# Patient Record
Sex: FEMALE | Race: WHITE | Employment: FULL TIME | URBAN - METROPOLITAN AREA
[De-identification: names, ages, dates, MRNs, and addresses within clinical notes are randomized per-mention and may not be internally consistent; named-entity substitution may affect disease eponyms.]

---

## 2024-05-01 LAB — HBA1C MFR BLD HPLC: 5.9 %

## 2024-08-19 LAB — HBA1C MFR BLD HPLC: 6.2 %

## 2024-08-29 ENCOUNTER — OFFICE VISIT (OUTPATIENT)
Dept: FAMILY MEDICINE CLINIC | Facility: HOSPITAL | Age: 54
End: 2024-08-29

## 2024-08-29 VITALS
HEIGHT: 60 IN | WEIGHT: 162.8 LBS | SYSTOLIC BLOOD PRESSURE: 115 MMHG | BODY MASS INDEX: 31.96 KG/M2 | DIASTOLIC BLOOD PRESSURE: 80 MMHG | OXYGEN SATURATION: 95 % | HEART RATE: 79 BPM | TEMPERATURE: 98.3 F

## 2024-08-29 DIAGNOSIS — G44.209 TENSION HEADACHE: Primary | ICD-10-CM

## 2024-08-29 DIAGNOSIS — Z12.11 SCREENING FOR MALIGNANT NEOPLASM OF COLON: ICD-10-CM

## 2024-08-29 DIAGNOSIS — R06.83 SNORING: ICD-10-CM

## 2024-08-29 DIAGNOSIS — H57.9 ITCHY EYES: ICD-10-CM

## 2024-08-29 DIAGNOSIS — Z12.31 SCREENING MAMMOGRAM FOR BREAST CANCER: ICD-10-CM

## 2024-08-29 DIAGNOSIS — G47.00 INSOMNIA, UNSPECIFIED TYPE: ICD-10-CM

## 2024-08-29 PROCEDURE — 99214 OFFICE O/P EST MOD 30 MIN: CPT

## 2024-08-29 RX ORDER — FENOFIBRATE 145 MG/1
145 TABLET, COATED ORAL DAILY
COMMUNITY

## 2024-08-29 RX ORDER — IBUPROFEN 400 MG/1
400 TABLET, FILM COATED ORAL EVERY 4 HOURS PRN
Qty: 90 TABLET | Refills: 0 | Status: SHIPPED | OUTPATIENT
Start: 2024-08-29

## 2024-08-29 RX ORDER — ROSUVASTATIN CALCIUM 10 MG/1
10 TABLET, COATED ORAL DAILY
COMMUNITY

## 2024-08-29 RX ORDER — KETOTIFEN FUMARATE 0.35 MG/ML
1 SOLUTION/ DROPS OPHTHALMIC 2 TIMES DAILY PRN
Qty: 10 ML | Refills: 0 | Status: SHIPPED | OUTPATIENT
Start: 2024-08-29

## 2024-08-29 RX ORDER — VENLAFAXINE HYDROCHLORIDE 150 MG/1
150 CAPSULE, EXTENDED RELEASE ORAL DAILY
COMMUNITY

## 2024-08-29 RX ORDER — MAGNESIUM 30 MG
30 TABLET ORAL 2 TIMES DAILY
COMMUNITY

## 2024-08-29 RX ORDER — SAW PALMETTO 250 MG
1 CAPSULE ORAL
Qty: 60 TABLET | Refills: 1 | Status: SHIPPED | OUTPATIENT
Start: 2024-08-29 | End: 2024-08-29 | Stop reason: CLARIF

## 2024-08-29 RX ORDER — UREA 10 %
500 LOTION (ML) TOPICAL DAILY
COMMUNITY

## 2024-08-29 NOTE — PROGRESS NOTES
Ambulatory Visit  Name: Ramona Flor      : 1970      MRN: [id number]  Encounter Provider: Gilbert Antunez MD  Encounter Date: 2024   Encounter department: [facility] [address]    Assessment & Plan   1. Tension headache  Headache handout reviewed and provided  -     Ambulatory Referral to Neurology; Future  -     Acetaminophen 325 MG CAPS; Take up to 3 capsules (975 mg) every 6 hours as needed for headaches.  Take the minimum effective dose  -     ibuprofen (MOTRIN) 400 mg tablet; Take 1 tablet (400 mg total) by mouth every 4 (four) hours as needed for mild pain, headaches or moderate pain  2. Screening mammogram for breast cancer  -     Mammo screening bilateral w 3d and cad; Future  3. Screening for malignant neoplasm of colon  -     Ambulatory Referral to Gastroenterology; Future  4. Insomnia, unspecified type  -     Ambulatory Referral to Sleep Medicine; Future  5. Snoring  -     Ambulatory Referral to Sleep Medicine; Future  6. Itchy eyes  -     Ketotifen Fumarate (ZADITOR) 0.035 % ophthalmic solution; Administer 1 drop to both eyes 2 (two) times a day as needed (eye irritation)       History of Present Illness     HPI  Patient is a 53-year-old Latvian and English speaking female who presents to establish care and address her headaches that have been present for about 2 weeks.  She reports frontal/temporal bilateral headaches that have been occurring daily for about 2 weeks which is not typical for her.  She denies any associated photophobia, phonophobia, nausea, aura, migraine history, eye pain, visual disturbances, weakness, numbness or any neurological abnormalities.  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  She reports that the headache is relieved by both Tylenol and NSAIDs, but frequently relapses after treatment.  She reports a history of heavy snoring and would like to get a sleep  "study which have been recommended for her in the past.  She also reports a history of chronic watery itchy eyes and is requesting eyedrop treatment.        Review of Systems   Constitutional:  Negative for chills and fever.   HENT:  Negative for sinus pressure.    Eyes:  Positive for itching. Negative for photophobia and visual disturbance.   Respiratory:  Negative for shortness of breath.    Cardiovascular:  Negative for chest pain.   Gastrointestinal:  Negative for diarrhea, nausea and vomiting.   Neurological:  Positive for headaches. Negative for dizziness, syncope and weakness.   Psychiatric/Behavioral:  Positive for sleep disturbance. The patient is not nervous/anxious.        Objective     /80   Pulse 79   Temp 98.3 °F (36.8 °C)   Ht 4' 11.75\" (1.518 m)   Wt 73.8 kg (162 lb 12.8 oz)   SpO2 95%   BMI 32.06 kg/m²     Physical Exam  Vitals and nursing note reviewed.   Constitutional:       General: She is not in acute distress.     Appearance: She is well-developed.   HENT:      Head: Normocephalic and atraumatic.   Eyes:      Extraocular Movements: Extraocular movements intact.      Conjunctiva/sclera: Conjunctivae normal.      Pupils: Pupils are equal, round, and reactive to light.   Cardiovascular:      Rate and Rhythm: Normal rate and regular rhythm.      Heart sounds: No murmur heard.  Pulmonary:      Effort: Pulmonary effort is normal. No respiratory distress.      Breath sounds: Normal breath sounds.   Musculoskeletal:         General: No swelling.   Skin:     General: Skin is warm and dry.      Capillary Refill: Capillary refill takes less than 2 seconds.   Neurological:      General: No focal deficit present.      Mental Status: She is alert and oriented to person, place, and time.      Cranial Nerves: No cranial nerve deficit.      Sensory: No sensory deficit.   Psychiatric:         Mood and Affect: Mood normal.       Administrative Statements           "

## 2024-08-30 ENCOUNTER — TELEPHONE (OUTPATIENT)
Dept: ADMINISTRATIVE | Facility: OTHER | Age: 54
End: 2024-08-30

## 2024-08-30 NOTE — TELEPHONE ENCOUNTER
----- Message from Jazmyn MCNEIL sent at 8/29/2024  1:25 PM EDT -----  Regarding: pap  08/29/24 1:25 PM    Hello, our patient Ramona Flor has had Pap Smear (HPV) aka Cervical Cancer Screening completed/performed. Please assist in updating the patient chart by pulling the Care Everywhere (CE) document. The date of service is 5/17/2022.     Thank you,  JAZMYN JORDAN  [facility]   
Upon review of the In Basket request we were able to locate, review, and update the patient chart as requested for Pap Smear (HPV) aka Cervical Cancer Screening.    Any additional questions or concerns should be emailed to the Practice Liaisons via the appropriate education email address, please do not reply via In Basket.    Thank you  Davin Rocha MA   [facility]          
36.8

## 2024-11-26 ENCOUNTER — TELEPHONE (OUTPATIENT)
Age: 54
End: 2024-11-26

## 2024-11-26 ENCOUNTER — OFFICE VISIT (OUTPATIENT)
Dept: FAMILY MEDICINE CLINIC | Facility: HOSPITAL | Age: 54
End: 2024-11-26
Payer: COMMERCIAL

## 2024-11-26 VITALS
HEIGHT: 60 IN | DIASTOLIC BLOOD PRESSURE: 80 MMHG | WEIGHT: 163.6 LBS | OXYGEN SATURATION: 96 % | HEART RATE: 84 BPM | TEMPERATURE: 96.9 F | BODY MASS INDEX: 32.12 KG/M2 | SYSTOLIC BLOOD PRESSURE: 116 MMHG

## 2024-11-26 DIAGNOSIS — R73.03 PREDIABETES: ICD-10-CM

## 2024-11-26 DIAGNOSIS — E78.5 DYSLIPIDEMIA: ICD-10-CM

## 2024-11-26 DIAGNOSIS — E66.811 CLASS 1 OBESITY WITH SERIOUS COMORBIDITY AND BODY MASS INDEX (BMI) OF 32.0 TO 32.9 IN ADULT, UNSPECIFIED OBESITY TYPE: ICD-10-CM

## 2024-11-26 DIAGNOSIS — Z98.82 BREAST IMPLANT STATUS: ICD-10-CM

## 2024-11-26 DIAGNOSIS — Z12.11 SCREENING FOR MALIGNANT NEOPLASM OF COLON: Primary | ICD-10-CM

## 2024-11-26 PROCEDURE — 99214 OFFICE O/P EST MOD 30 MIN: CPT

## 2024-11-26 RX ORDER — SEMAGLUTIDE 0.25 MG/.5ML
INJECTION, SOLUTION SUBCUTANEOUS
Qty: 2 ML | Refills: 0 | Status: SHIPPED | OUTPATIENT
Start: 2024-11-26

## 2024-11-26 RX ORDER — KETOTIFEN FUMARATE 0.35 MG/ML
1 SOLUTION/ DROPS OPHTHALMIC 2 TIMES DAILY
COMMUNITY
Start: 2024-08-29

## 2024-11-26 NOTE — PROGRESS NOTES
Name: Ramona Flor      : 1970      MRN: [id number]  Encounter Provider: Gilbert Antunez MD  Encounter Date: 2024   Encounter department: [facility] [address]  :        The ASCVD Risk score (Ramila MARROQUIN, et al., 2019) failed to calculate for the following reasons:    Cannot find a previous HDL lab    Cannot find a previous total cholesterol lab    Assessment & Plan  Screening for malignant neoplasm of colon    Orders:    Ambulatory Referral to Gastroenterology; Future    Prediabetes  Labs reviewed with patient, A1c worsening, counseled on lifestyle modifications including diet and exercise, prediabetes handout provided, starting Wegovy  Orders:    Semaglutide-Weight Management (Wegovy) 0.25 MG/0.5ML; Inject 0.25 mg under the skin weekly    Breast implant status  Patient concerned about the safety of her implants which she has had for over 20 years and would like to see a specialist to consider having them removed.  She denies any specific symptoms, denies any breast pain or tenderness.    Orders:    Ambulatory Referral to Plastic Surgery; Future    Class 1 obesity with serious comorbidity and body mass index (BMI) of 32.0 to 32.9 in adult, unspecified obesity type      Counseled on lifestyle modifications including diet and exercise, handout provided.  Will start GLP-1 for obesity with complicating factors of dyslipidemia and prediabetes.  Denies any personal or family history of medullary thyroid cancer.    Orders:    Semaglutide-Weight Management (Wegovy) 0.25 MG/0.5ML; Inject 0.25 mg under the skin weekly    Dyslipidemia  Labs reviewed with patient, counseled on lifestyle modifications, dyslipidemia handout reviewed and provided  Orders:    Semaglutide-Weight Management (Wegovy) 0.25 MG/0.5ML; Inject 0.25 mg under the skin weekly           History of Present Illness     HPI  Breast implants for 20 years, concerned about their long-term safety and would like to see a  "specialist and consideration of removal.  Denies any breast symptoms including pain or tenderness.    Review of Systems   Constitutional:  Negative for chills and fever.   Cardiovascular:  Negative for chest pain.   Skin:         Denies breast pain, tenderness or abnormalities   Neurological:  Negative for headaches.          Objective   /80   Pulse 84   Temp (!) 96.9 °F (36.1 °C)   Ht 4' 11.75\" (1.518 m)   Wt 74.2 kg (163 lb 9.6 oz)   SpO2 96%   BMI 32.22 kg/m²      Physical Exam  Constitutional:       General: She is not in acute distress.     Appearance: She is obese. She is not ill-appearing, toxic-appearing or diaphoretic.   Eyes:      Conjunctiva/sclera: Conjunctivae normal.   Neurological:      Mental Status: She is alert and oriented to person, place, and time.   Psychiatric:         Mood and Affect: Mood normal.         Behavior: Behavior normal.         "

## 2024-11-26 NOTE — TELEPHONE ENCOUNTER
PA for (Wegovy) 0.25 MG/0.5ML SUBMITTED to [employer]    via    []CMM-KEY:   [x]Surescripts-Case ID # [id number]  []Availity-Auth ID # NDC #   []Faxed to plan   []Other website   []Phone call Case ID #     [x]PA sent as URGENT    All office notes, labs and other pertaining documents and studies sent. Clinical questions answered. Awaiting determination from insurance company.     Turnaround time for your insurance to make a decision on your Prior Authorization can take 7-21 business days.

## 2024-11-27 ENCOUNTER — TELEPHONE (OUTPATIENT)
Age: 54
End: 2024-11-27

## 2024-11-27 NOTE — TELEPHONE ENCOUNTER
PA for (Wegovy) 0.25 MG/0.5ML APPROVED     Date(s) approved 11/26/24-05/26/25    Case # [id number]    Patient advised by          []MyChart Message  []Phone call   [x]LMOM  []L/M to call office as no active Communication consent on file  []Unable to leave detailed message as VM not approved on Communication consent       Pharmacy advised by    [x]Fax  []Phone call    Approval letter scanned into [employer] , not available at time of approval

## 2024-11-27 NOTE — TELEPHONE ENCOUNTER
Patient called to [facility] referral for implants  Patient had implants about 20 years ago and now the left side feels uncomfortable.

## 2024-12-05 ENCOUNTER — OFFICE VISIT (OUTPATIENT)
Dept: FAMILY MEDICINE CLINIC | Facility: HOSPITAL | Age: 54
End: 2024-12-05
Payer: COMMERCIAL

## 2024-12-05 VITALS
HEART RATE: 83 BPM | SYSTOLIC BLOOD PRESSURE: 128 MMHG | HEIGHT: 60 IN | BODY MASS INDEX: 32 KG/M2 | DIASTOLIC BLOOD PRESSURE: 80 MMHG | TEMPERATURE: 99.2 F | WEIGHT: 163 LBS | OXYGEN SATURATION: 96 %

## 2024-12-05 DIAGNOSIS — J01.10 ACUTE NON-RECURRENT FRONTAL SINUSITIS: Primary | ICD-10-CM

## 2024-12-05 DIAGNOSIS — E66.811 CLASS 1 OBESITY DUE TO EXCESS CALORIES WITH BODY MASS INDEX (BMI) OF 32.0 TO 32.9 IN ADULT, UNSPECIFIED WHETHER SERIOUS COMORBIDITY PRESENT: ICD-10-CM

## 2024-12-05 DIAGNOSIS — J40 TRACHEOBRONCHITIS: ICD-10-CM

## 2024-12-05 DIAGNOSIS — E66.09 CLASS 1 OBESITY DUE TO EXCESS CALORIES WITH BODY MASS INDEX (BMI) OF 32.0 TO 32.9 IN ADULT, UNSPECIFIED WHETHER SERIOUS COMORBIDITY PRESENT: ICD-10-CM

## 2024-12-05 DIAGNOSIS — H66.005 RECURRENT ACUTE SUPPURATIVE OTITIS MEDIA WITHOUT SPONTANEOUS RUPTURE OF LEFT TYMPANIC MEMBRANE: ICD-10-CM

## 2024-12-05 PROBLEM — E78.2 MIXED HYPERLIPIDEMIA: Status: ACTIVE | Noted: 2024-12-05

## 2024-12-05 PROBLEM — E53.8 VITAMIN B12 DEFICIENCY: Status: ACTIVE | Noted: 2024-12-05

## 2024-12-05 PROCEDURE — 99214 OFFICE O/P EST MOD 30 MIN: CPT | Performed by: INTERNAL MEDICINE

## 2024-12-05 RX ORDER — BENZONATATE 100 MG/1
100 CAPSULE ORAL 3 TIMES DAILY PRN
Qty: 20 CAPSULE | Refills: 0 | Status: SHIPPED | OUTPATIENT
Start: 2024-12-05

## 2024-12-05 NOTE — PROGRESS NOTES
Assessment/Plan:     Diagnosis ICD-10-CM Associated Orders   1. Acute non-recurrent frontal sinusitis  J01.10 amoxicillin-clavulanate (AUGMENTIN) 875-125 mg per tablet      2. Class 1 obesity due to excess calories with body mass index (BMI) of 32.0 to 32.9 in adult, unspecified whether serious comorbidity present  E66.811     E66.09     Z68.32       3. Recurrent acute suppurative otitis media without spontaneous rupture of left tympanic membrane  H66.005       4. Tracheobronchitis  J40 benzonatate (TESSALON PERLES) 100 mg capsule          Problem List Items Addressed This Visit          Respiratory    Acute non-recurrent frontal sinusitis - Primary    Started yesterday at work with sore throat and cough 12/4/24 . Had vomting  and diarrhea 12/3/24  which   improved .now feeling worse with fever and increased headache and fatigue   Also having left ear pain         Relevant Medications    amoxicillin-clavulanate (AUGMENTIN) 875-125 mg per tablet       Other    Class 1 obesity due to excess calories with body mass index (BMI) of 32.0 to 32.9 in adult     Started on wegovy on Tuesday 12/3/24- had stomach issues on day before that          Other Visit Diagnoses         Recurrent acute suppurative otitis media without spontaneous rupture of left tympanic membrane          Tracheobronchitis        Relevant Medications    benzonatate (TESSALON PERLES) 100 mg capsule              No follow-ups on file.      Subjective:    Patient ID: Ramona Flor is a 54 y.o. female    Here for special appt today- had  increased resp symptoms which have now moved down into her chest since yesterday. Had pain with ocugh. 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 She tested negative for covid yesterday at work.    Sore Throat   This is a new problem. The current episode started yesterday. The problem has been gradually worsening. The maximum temperature recorded  "prior to her arrival was 100.4 - 100.9 F. Associated symptoms include coughing.       The following portions of the patient's history were reviewed and updated as appropriate: allergies, current medications and problem list.     Review of Systems   HENT:  Positive for sore throat.    Respiratory:  Positive for cough.          Objective:      Current Outpatient Medications:     amoxicillin-clavulanate (AUGMENTIN) 875-125 mg per tablet, Take 1 tablet by mouth 2 (two) times a day for 7 days, Disp: 14 tablet, Rfl: 0    benzonatate (TESSALON PERLES) 100 mg capsule, Take 1 capsule (100 mg total) by mouth 3 (three) times a day as needed for cough, Disp: 20 capsule, Rfl: 0    Cholecalciferol (VITAMIN D-3 PO), Take by mouth, Disp: , Rfl:     fenofibrate (TRICOR) 145 mg tablet, Take 145 mg by mouth daily, Disp: , Rfl:     ibuprofen (MOTRIN) 400 mg tablet, Take 1 tablet (400 mg total) by mouth every 4 (four) hours as needed for mild pain, headaches or moderate pain, Disp: 90 tablet, Rfl: 0    Ketotifen Fumarate (ZADITOR) 0.035 % ophthalmic solution, Administer 1 drop to both eyes 2 (two) times a day, Disp: , Rfl:     magnesium 30 MG tablet, Take 30 mg by mouth 2 (two) times a day, Disp: , Rfl:     rosuvastatin (CRESTOR) 10 MG tablet, Take 10 mg by mouth daily, Disp: , Rfl:     Semaglutide-Weight Management (Wegovy) 0.25 MG/0.5ML, Inject 0.25 mg under the skin weekly, Disp: 2 mL, Rfl: 0    venlafaxine (EFFEXOR-XR) 150 mg 24 hr capsule, Take 150 mg by mouth daily, Disp: , Rfl:     vitamin B-12 (VITAMIN B-12) 500 mcg tablet, Take 500 mcg by mouth daily, Disp: , Rfl:     VITAMIN K PO, Take by mouth, Disp: , Rfl:     Blood pressure 128/80, pulse 83, temperature 99.2 °F (37.3 °C), height 4' 11.75\" (1.518 m), weight 73.9 kg (163 lb), SpO2 96%.     Physical Exam  Vitals and nursing note reviewed.   Constitutional:       Appearance: She is ill-appearing.   HENT:      Right Ear: Tympanic membrane normal.      Left Ear: Tympanic membrane " is erythematous.      Mouth/Throat:      Mouth: Mucous membranes are moist.      Pharynx: Posterior oropharyngeal erythema present.   Eyes:      Conjunctiva/sclera: Conjunctivae normal.   Cardiovascular:      Rate and Rhythm: Normal rate and regular rhythm.   Pulmonary:      Comments: Upper airway rhonchi  Chest:      Chest wall: Tenderness present.   Abdominal:      General: There is no distension.   Skin:     Findings: No erythema.   Neurological:      Mental Status: She is alert.

## 2024-12-05 NOTE — LETTER
December 5, 2024     Patient: Ramona Flor  YOB: 1970  Date of Visit: 12/5/2024      To Whom it May Concern:    Ramona Flor is under my professional care. Ramona was seen in my office on 12/5/2024. Ramona may return to work on Sunday 12/8/24 if fever has resolved .    If you have any questions or concerns, please don't hesitate to call.         Sincerely,          Hien Maradiaga,         CC: No Recipients

## 2024-12-05 NOTE — LETTER
December 5, 2024     Patient: Ramona Flor  YOB: 1970  Date of Visit: 12/5/2024      To Whom it May Concern:    Ramona Flor is under my professional care. Ramona was seen in my office on 12/5/2024. Ramona may return to work on Sunday 12/8/24 if no fevers have cleared .    If you have any questions or concerns, please don't hesitate to call.         Sincerely,          Hien Maradiaga,         CC: No Recipients

## 2024-12-05 NOTE — ASSESSMENT & PLAN NOTE
Started yesterday at work with sore throat and cough 12/4/24 . Had vomting  and diarrhea 12/3/24  which   improved .now feeling worse with fever and increased headache and fatigue   Also having left ear pain

## 2024-12-13 ENCOUNTER — OFFICE VISIT (OUTPATIENT)
Dept: FAMILY MEDICINE CLINIC | Facility: HOSPITAL | Age: 54
End: 2024-12-13
Payer: COMMERCIAL

## 2024-12-13 VITALS
OXYGEN SATURATION: 97 % | DIASTOLIC BLOOD PRESSURE: 84 MMHG | WEIGHT: 161.6 LBS | BODY MASS INDEX: 31.73 KG/M2 | SYSTOLIC BLOOD PRESSURE: 130 MMHG | HEIGHT: 60 IN | HEART RATE: 81 BPM

## 2024-12-13 DIAGNOSIS — R11.0 NAUSEA: ICD-10-CM

## 2024-12-13 DIAGNOSIS — K52.9 GASTROENTERITIS: Primary | ICD-10-CM

## 2024-12-13 PROCEDURE — 99213 OFFICE O/P EST LOW 20 MIN: CPT

## 2024-12-13 RX ORDER — SACCHAROMYCES BOULARDII 250 MG
250 CAPSULE ORAL 2 TIMES DAILY
Qty: 60 CAPSULE | Refills: 0 | Status: SHIPPED | OUTPATIENT
Start: 2024-12-13

## 2024-12-13 RX ORDER — ONDANSETRON 4 MG/1
4 TABLET, FILM COATED ORAL EVERY 8 HOURS PRN
Qty: 20 TABLET | Refills: 0 | Status: SHIPPED | OUTPATIENT
Start: 2024-12-13

## 2024-12-13 NOTE — PROGRESS NOTES
"Name: Ramona Flor      : 1970      MRN: [id number]  Encounter Provider: Gilbert Antunez MD  Encounter Date: 2024   Encounter department: [facility] [address]  :  Assessment & Plan  Gastroenteritis  2 days of nonbloody nausea, vomiting and diarrhea, vomited once yesterday.  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  Exact etiology unknown.  Will treat symptomatically with probiotic and Zofran.  For gastroenteritis, recommended oral hydration, bland diet, and consideration of stool studies if diarrhea persists for over a week.  Gastroenteritis handout reviewed and provided including ED instructions.       Nausea    Orders:    ondansetron (ZOFRAN) 4 mg tablet; Take 1 tablet (4 mg total) by mouth every 8 (eight) hours as needed for nausea or vomiting    saccharomyces boulardii (FLORASTOR) 250 mg capsule; Take 1 capsule (250 mg total) by mouth 2 (two) times a day           History of Present Illness     HPI  Vomited yesterday, has been nausous and has diarrhea, she suspects side effect to augmenten for sinusitis.  Symptoms for 2 days  Review of Systems   Constitutional:  Negative for chills and fever.   HENT:  Negative for congestion, ear pain and sinus pain.    Respiratory:  Negative for cough.    Gastrointestinal:  Positive for diarrhea, nausea and vomiting. Negative for abdominal pain and blood in stool.       Objective   /84   Pulse 81   Ht 4' 11.75\" (1.518 m)   Wt 73.3 kg (161 lb 9.6 oz)   SpO2 97%   BMI 31.82 kg/m²      Physical Exam  Constitutional:       General: She is not in acute distress.     Appearance: She is obese.   HENT:      Head: Normocephalic.   Cardiovascular:      Rate and Rhythm: Normal rate and regular rhythm.      Heart sounds: Normal heart sounds. No murmur " heard.  Pulmonary:      Effort: Pulmonary effort is normal. No respiratory distress.      Breath sounds: Normal breath sounds.   Neurological:      Mental Status: She is alert and oriented to person, place, and time.   Psychiatric:         Mood and Affect: Mood normal.         Behavior: Behavior normal.

## 2025-01-04 PROBLEM — J01.10 ACUTE NON-RECURRENT FRONTAL SINUSITIS: Status: RESOLVED | Noted: 2024-12-05 | Resolved: 2025-01-04

## 2025-01-06 ENCOUNTER — OFFICE VISIT (OUTPATIENT)
Dept: FAMILY MEDICINE CLINIC | Facility: HOSPITAL | Age: 55
End: 2025-01-06
Payer: COMMERCIAL

## 2025-01-06 VITALS
TEMPERATURE: 97.6 F | HEIGHT: 60 IN | WEIGHT: 163.8 LBS | HEART RATE: 103 BPM | SYSTOLIC BLOOD PRESSURE: 120 MMHG | BODY MASS INDEX: 32.16 KG/M2 | OXYGEN SATURATION: 95 % | DIASTOLIC BLOOD PRESSURE: 82 MMHG

## 2025-01-06 DIAGNOSIS — H66.90 ACUTE OTITIS MEDIA, UNSPECIFIED OTITIS MEDIA TYPE: Primary | ICD-10-CM

## 2025-01-06 DIAGNOSIS — E78.2 MIXED HYPERLIPIDEMIA: ICD-10-CM

## 2025-01-06 DIAGNOSIS — J06.9 UPPER RESPIRATORY TRACT INFECTION, UNSPECIFIED TYPE: ICD-10-CM

## 2025-01-06 DIAGNOSIS — E66.09 CLASS 1 OBESITY DUE TO EXCESS CALORIES WITH BODY MASS INDEX (BMI) OF 32.0 TO 32.9 IN ADULT, UNSPECIFIED WHETHER SERIOUS COMORBIDITY PRESENT: ICD-10-CM

## 2025-01-06 DIAGNOSIS — E66.811 CLASS 1 OBESITY DUE TO EXCESS CALORIES WITH BODY MASS INDEX (BMI) OF 32.0 TO 32.9 IN ADULT, UNSPECIFIED WHETHER SERIOUS COMORBIDITY PRESENT: ICD-10-CM

## 2025-01-06 PROCEDURE — 99214 OFFICE O/P EST MOD 30 MIN: CPT

## 2025-01-06 RX ORDER — SEMAGLUTIDE 0.5 MG/.5ML
INJECTION, SOLUTION SUBCUTANEOUS
Qty: 2 ML | Refills: 0 | Status: SHIPPED | OUTPATIENT
Start: 2025-01-06

## 2025-01-06 RX ORDER — FLUTICASONE PROPIONATE 50 MCG
2 SPRAY, SUSPENSION (ML) NASAL DAILY PRN
Qty: 15.8 ML | Refills: 0 | Status: SHIPPED | OUTPATIENT
Start: 2025-01-06

## 2025-01-06 RX ORDER — ATOMOXETINE 25 MG/1
1 CAPSULE ORAL DAILY
COMMUNITY
Start: 2024-12-24

## 2025-01-06 NOTE — ASSESSMENT & PLAN NOTE
Tolerating 0.25 Wegovy well, weight trend reviewed, will increase Wegovy from 0.2 to 0.5    Orders:    Semaglutide-Weight Management (Wegovy) 0.5 MG/0.5ML; Inject 0.5 mg under the skin weekly

## 2025-01-06 NOTE — LETTER
January 6, 2025     Patient: Ramona Flor  YOB: 1970  Date of Visit: 1/6/2025      To Whom it May Concern:    Ramona Flor is under my professional care. Ramona was seen in my office on 1/6/2025. Ramona is to be medically excused from work on 1/6/25.    If you have any questions or concerns, please don't hesitate to call.         Sincerely,          Gilbert Antunez MD        CC: No Recipients

## 2025-01-06 NOTE — ASSESSMENT & PLAN NOTE
Orders:    Semaglutide-Weight Management (Wegovy) 0.5 MG/0.5ML; Inject 0.5 mg under the skin weekly

## 2025-01-06 NOTE — PROGRESS NOTES
"Name: Ramona Flor      : 1970      MRN: [id number]  Encounter Provider: Gilbert Antunez MD  Encounter Date: 2025   Encounter department: [facility] CARE [address]  :  Assessment & Plan  Acute otitis media, unspecified otitis media type  Right bulging erythematous TM  Orders:    amoxicillin-clavulanate (AUGMENTIN) 875-125 mg per tablet; Take 1 tablet by mouth every 12 (twelve) hours for 7 days    Upper respiratory tract infection, unspecified type    Orders:    dextromethorphan-guaifenesin (MUCINEX DM)  MG per 12 hr tablet; Take 1 tablet by mouth every 12 (twelve) hours    fluticasone (FLONASE) 50 mcg/act nasal spray; 2 sprays into each nostril daily as needed for rhinitis    Class 1 obesity due to excess calories with body mass index (BMI) of 32.0 to 32.9 in adult, unspecified whether serious comorbidity present  Tolerating 0.25 Wegovy well, weight trend reviewed, will increase Wegovy from 0.2 to 0.5    Orders:    Semaglutide-Weight Management (Wegovy) 0.5 MG/0.5ML; Inject 0.5 mg under the skin weekly    Mixed hyperlipidemia    Orders:    Semaglutide-Weight Management (Wegovy) 0.5 MG/0.5ML; Inject 0.5 mg under the skin weekly           History of Present Illness     HPI  1 week of URI symptoms, runny nose, ear pressure, sneezing, dry cough.  Denies fever, chills or SOB     Review of Systems   Constitutional:  Negative for chills and fever.   HENT:  Positive for congestion, ear pain, sinus pressure and sore throat.    Respiratory:  Positive for cough.    Cardiovascular:  Negative for chest pain.   Gastrointestinal:  Negative for diarrhea, nausea and vomiting.   Neurological:  Positive for headaches. Negative for dizziness.       Objective   /82   Pulse 103   Temp 97.6 °F (36.4 °C) (Oral)   Ht 4' 11.75\" (1.518 m)   Wt 74.3 kg (163 lb 12.8 oz)   SpO2 95%   BMI 32.26 kg/m²      Physical Exam  Constitutional:       General: She is not in acute distress.     " Appearance: She is obese.   HENT:      Right Ear: Ear canal and external ear normal. There is no impacted cerumen.      Left Ear: Tympanic membrane, ear canal and external ear normal. There is no impacted cerumen.      Ears:      Comments: Bulging, erythematous right TM     Nose: Congestion present.      Mouth/Throat:      Mouth: Mucous membranes are moist.      Pharynx: Oropharynx is clear. Posterior oropharyngeal erythema present. No oropharyngeal exudate.   Cardiovascular:      Heart sounds: Normal heart sounds. No murmur heard.  Pulmonary:      Effort: Pulmonary effort is normal. No respiratory distress.      Breath sounds: Normal breath sounds. No stridor. No wheezing, rhonchi or rales.   Neurological:      Mental Status: She is alert and oriented to person, place, and time.

## 2025-01-25 NOTE — PROGRESS NOTES
Name: Ramona Flor      : 1970      MRN: [id number]  Encounter Provider: Eladio Lea MD  Encounter Date: 2025   Encounter department: [facility]  :  Assessment & Plan  Suspected sleep apnea  Loud snoring, unrefreshing sleep, gasping/choking witnessed apneas during sleep  On exam has a crowded airway Mallampati 4, as well as a right-sided nasal polyp  We discussed the pathophysiology of obstructive sleep apnea    At this time I recommend a home sleep study  If diagnosed with RONALD I recommend a trial of CPAP  I will call her after home sleep study  She is amenable for trial CPAP    Follow-up in 3 to 4 months for CPAP compliance review  Orders:    Home Study; Future    Shifting sleep-work schedule, affecting sleep  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 female with a history of hyperlipidemia, vitamin B12 deficiency, ADHD who presents for the evaluation of snoring    She has had loud snoring for years.  Sometimes she wakes up with choking sensation possibly due to saliva.  Sometimes she can hear herself snoring.  Daughter says she had witnessed apneas.      Currently she works between 11pm to 7am.  She usually sleeps 9am to the afternoon.  She wakes up a few times and goes back to sleep.  She works at a nursing home.  She typically has 6 shifts a week.  She has been on night shift for 2 months.  Even before night shift she had trouble sleeping overnight with occasional sleep onset insomnia.  Now that she is sleeping primarily during the day she says she does not get good sleep or deep sleep and wakes up frequently..  No trouble staying awake at work.      No sleep aids or stimulants.  No ENT surgery.  She can usually breathe through her nose.  No  sleep walking or other parasomnias.  No smoking, ETOH, drug history.      Review of Systems  Past Medical History   History reviewed. No pertinent past medical history.  History reviewed. No pertinent surgical history.  Family History   Problem Relation Age of Onset    Hyperlipidemia Father     Prostate cancer Sister     Breast cancer Maternal Grandmother     Breast cancer Maternal Aunt       reports that she has never smoked. She has never used smokeless tobacco. She reports that she does not drink alcohol and does not use drugs.  Current Outpatient Medications on File Prior to Visit   Medication Sig Dispense Refill    atoMOXetine (STRATTERA) 25 mg capsule Take 1 capsule by mouth in the morning      benzonatate (TESSALON PERLES) 100 mg capsule Take 1 capsule (100 mg total) by mouth 3 (three) times a day as needed for cough 20 capsule 0    Cholecalciferol (VITAMIN D-3 PO) Take by mouth      fenofibrate (TRICOR) 145 mg tablet Take 145 mg by mouth daily      ibuprofen (MOTRIN) 400 mg tablet Take 1 tablet (400 mg total) by mouth every 4 (four) hours as needed for mild pain, headaches or moderate pain 90 tablet 0    Ketotifen Fumarate (ZADITOR) 0.035 % ophthalmic solution Administer 1 drop to both eyes 2 (two) times a day (Patient taking differently: Administer 1 drop to both eyes 2 (two) times a day as needed)      magnesium 30 MG tablet Take 30 mg by mouth 2 (two) times a day      ondansetron (ZOFRAN) 4 mg tablet Take 1 tablet (4 mg total) by mouth every 8 (eight) hours as needed for nausea or vomiting 20 tablet 0    rosuvastatin (CRESTOR) 10 MG tablet Take 10 mg by mouth daily      Semaglutide-Weight Management (Wegovy) 0.5 MG/0.5ML Inject 0.5 mg under the skin weekly 2 mL 0    venlafaxine (EFFEXOR-XR) 150 mg 24 hr capsule Take 150 mg by mouth daily      vitamin B-12 (VITAMIN B-12) 500 mcg tablet Take 500 mcg by mouth daily      VITAMIN K PO Take by mouth      dextromethorphan-guaifenesin (MUCINEX DM)  MG per  12 hr tablet Take 1 tablet by mouth every 12 (twelve) hours (Patient not taking: Reported on 1/28/2025) 60 tablet 0    fluticasone (FLONASE) 50 mcg/act nasal spray 2 sprays into each nostril daily as needed for rhinitis (Patient not taking: Reported on 1/28/2025) 15.8 mL 0    saccharomyces boulardii (FLORASTOR) 250 mg capsule Take 1 capsule (250 mg total) by mouth 2 (two) times a day (Patient not taking: Reported on 1/28/2025) 60 capsule 0     No current facility-administered medications on file prior to visit.   No Known Allergies      Historical Information       Objective   There were no vitals taken for this visit.     Physical Exam  Vitals and nursing note reviewed.   Constitutional:       General: She is not in acute distress.     Appearance: She is well-developed. She is not ill-appearing, toxic-appearing or diaphoretic.   HENT:      Head: Normocephalic and atraumatic.      Nose: Nose normal.      Comments: R nasal polyp     Mouth/Throat:      Mouth: Mucous membranes are moist.      Pharynx: Oropharynx is clear. No oropharyngeal exudate.      Comments: Mallampati 4  Crowded airway  Eyes:      General: No scleral icterus.     Extraocular Movements: Extraocular movements intact.      Conjunctiva/sclera: Conjunctivae normal.   Pulmonary:      Effort: Pulmonary effort is normal. No respiratory distress.      Breath sounds: No stridor.   Abdominal:      Tenderness: There is no guarding.   Musculoskeletal:         General: No swelling.      Cervical back: Normal range of motion. No rigidity.   Skin:     General: Skin is warm and dry.      Coloration: Skin is not jaundiced.   Neurological:      Mental Status: She is alert. Mental status is at baseline.   Psychiatric:         Mood and Affect: Mood normal.     Lab Results: I have reviewed pertinent labs.    Radiology Results Review : No pertinent imaging studies reviewed.  Other Study Results: Other Study Results Review : No additional pertinent studies reviewed.  PFT  Results Reviewed: NA

## 2025-01-28 ENCOUNTER — OFFICE VISIT (OUTPATIENT)
Age: 55
End: 2025-01-28
Payer: COMMERCIAL

## 2025-01-28 ENCOUNTER — TELEPHONE (OUTPATIENT)
Age: 55
End: 2025-01-28

## 2025-01-28 VITALS
OXYGEN SATURATION: 96 % | HEIGHT: 60 IN | WEIGHT: 165.8 LBS | HEART RATE: 89 BPM | BODY MASS INDEX: 32.55 KG/M2 | TEMPERATURE: 97.3 F | DIASTOLIC BLOOD PRESSURE: 76 MMHG | SYSTOLIC BLOOD PRESSURE: 122 MMHG

## 2025-01-28 DIAGNOSIS — G47.26 SHIFTING SLEEP-WORK SCHEDULE, AFFECTING SLEEP: ICD-10-CM

## 2025-01-28 DIAGNOSIS — R29.818 SUSPECTED SLEEP APNEA: Primary | ICD-10-CM

## 2025-01-28 PROCEDURE — 99243 OFF/OP CNSLTJ NEW/EST LOW 30: CPT | Performed by: INTERNAL MEDICINE

## 2025-01-28 NOTE — TELEPHONE ENCOUNTER
Tried to call to confirm appt for 01/29 with Dr Avila in [place]. Mailbox was full and could not leave message. No MYC.

## 2025-01-29 ENCOUNTER — COSMETIC (OUTPATIENT)
Age: 55
End: 2025-01-29

## 2025-01-29 VITALS
SYSTOLIC BLOOD PRESSURE: 124 MMHG | BODY MASS INDEX: 32.22 KG/M2 | WEIGHT: 164.13 LBS | TEMPERATURE: 97.6 F | HEIGHT: 60 IN | HEART RATE: 98 BPM | OXYGEN SATURATION: 97 % | DIASTOLIC BLOOD PRESSURE: 82 MMHG

## 2025-01-29 DIAGNOSIS — Z98.82 BREAST IMPLANT STATUS: ICD-10-CM

## 2025-01-29 PROCEDURE — COSCON COSMETIC CONSULTATION: Performed by: STUDENT IN AN ORGANIZED HEALTH CARE EDUCATION/TRAINING PROGRAM

## 2025-01-30 NOTE — PROGRESS NOTES
Plastic Surgery Consult    Reason for visit: implant removal from prior breast augmentation    HPI from 1/29/25  Patient is a pleasant 55 y/o [place]ian-speaking female who is interested in implant removal from remote breast augmentation 20 years ago. Patient is Z[place]ian speaking only and history was obtained via . Patient underwent bilateral submuscular saline breast augmentation 20 years ago via geri-areolar approach. She had been doing well until 5 years ago when she noted deflation of her breasts. Since that time, she states that she has had unattributable headaches, referencing some issues of breast implant illness. She was adamant in stating that her breasts deflated approximately 5 years ago. She wishes to proceed with removal of the implant shells.     Her last mammogram was within the past year and was normal.    ROS: 12 pt ROS negative, except as otherwise noted in HPI    PMH: none  FamHx: non-contrib  SurgHx: breast augmentation 20 years ago  SocHx: no tobacco, nicotine, ETOH  Meds: no blood thinners, no steroids  Allergies: NKDA    PE:    Vitals:    01/29/25 0948   BP: 124/82   Pulse: 98   Temp: 97.6 °F (36.4 °C)   SpO2: 97%       General: NC/AT, breathing comfortably on RA  Neuro: CN II-XII grossly intact, symmetric reflexes  HEENT: PERRLA, EOMI, external ears normal, no lesions or deformities, neck supple, trachea midline  Respiratory: CTAB, normal respiratory effort  Cardio: RRR, normal S1, S2, no murmur, rubs, gallops  GI: soft, non-tender, non-distended  Extremities/MSK: normal alignment, mobility, gait, no edema  Skin: no rashes, lesions, subcutaneous nodules    BMI 32.1    Bilateral moderate sized breasts with grade II ptosis  Normal nipple sensation  Geri-areolar scars bilaterally  No capsular contracture  No masses, nipple discharge, skin dimpling  Submuscular    A/P: 55 y/o [place]ambian-speaking female who presents with ruptured submuscular breast implants from 20 years ago.  -I discussed  that the saline was absorbed by the body. Patient re-iterated that her breasts were substantially larger for the first 10 years after augmentation and only 5 years ago had significant deflation. Based on her history, I am fairly confident that she has had rupture of her saline implants bilaterally. I discussed removal of the shells in clinic via IMF incision as long as she can tolerated under local. I do not suspect capsule issue as her breasts are soft. We will proceed with IMF incisions under local in the clinic and removal of implant shells. If patient is unable to tolerated under local, will proceed in OR. Patient agreed and acknowledged.  -Furthermore, I discussed that removal of the implant shells may not treat her headaches. Patient acknowledged.  -Will email quote and call to schedule.    Singh Avila MD   [facility]   [address] [place] [address], [address]   [place] [address]   Office: [phone]

## 2025-02-02 DIAGNOSIS — J06.9 UPPER RESPIRATORY TRACT INFECTION, UNSPECIFIED TYPE: ICD-10-CM

## 2025-02-03 RX ORDER — FLUTICASONE PROPIONATE 50 MCG
2 SPRAY, SUSPENSION (ML) NASAL DAILY PRN
Qty: 16 ML | Refills: 3 | Status: SHIPPED | OUTPATIENT
Start: 2025-02-03

## 2025-02-24 ENCOUNTER — TELEMEDICINE (OUTPATIENT)
Dept: FAMILY MEDICINE CLINIC | Facility: HOSPITAL | Age: 55
End: 2025-02-24
Payer: COMMERCIAL

## 2025-02-24 VITALS — HEIGHT: 60 IN | BODY MASS INDEX: 32.39 KG/M2 | WEIGHT: 165 LBS

## 2025-02-24 DIAGNOSIS — U07.1 COVID-19 VIRUS INFECTION: Primary | ICD-10-CM

## 2025-02-24 DIAGNOSIS — G44.209 TENSION HEADACHE: ICD-10-CM

## 2025-02-24 DIAGNOSIS — R73.01 IMPAIRED FASTING GLUCOSE: ICD-10-CM

## 2025-02-24 DIAGNOSIS — E78.2 MIXED HYPERLIPIDEMIA: ICD-10-CM

## 2025-02-24 PROCEDURE — 99213 OFFICE O/P EST LOW 20 MIN: CPT | Performed by: INTERNAL MEDICINE

## 2025-02-24 RX ORDER — NIRMATRELVIR AND RITONAVIR 300-100 MG
3 KIT ORAL 2 TIMES DAILY
Qty: 30 TABLET | Refills: 0 | Status: SHIPPED | OUTPATIENT
Start: 2025-02-24 | End: 2025-03-01

## 2025-02-24 RX ORDER — IBUPROFEN 400 MG/1
400 TABLET, FILM COATED ORAL EVERY 6 HOURS PRN
Qty: 90 TABLET | Refills: 0 | Status: SHIPPED | OUTPATIENT
Start: 2025-02-24

## 2025-02-24 NOTE — PROGRESS NOTES
COVID-19 Outpatient Progress Note  Name: Ramona Flor      : 1970      MRN: [id number]  Encounter Provider: Dillan Espion MD  Encounter Date: 2025   Encounter department: [facility] [address]  :  Assessment & Plan  COVID-19 virus infection    Orders:  •  nirmatrelvir & ritonavir (Paxlovid, 300/100,) tablet therapy pack; Take 3 tablets by mouth 2 (two) times a day for 5 days Take 2 nirmatrelvir tablets + 1 ritonavir tablet together per dose    Tension headache  Patient has history of tension headaches.  She takes ibuprofen.  She requested refill of the medication.  Orders:  •  ibuprofen (MOTRIN) 400 mg tablet; Take 1 tablet (400 mg total) by mouth every 6 (six) hours as needed for mild pain, headaches or moderate pain    Mixed hyperlipidemia  Has hyperlipidemia.  She requested rechecking her lipid profile  Orders:  •  Comprehensive metabolic panel; Future  •  Lipid panel; Future    Impaired fasting glucose  She impaired fasting glucose last August on review of labs in Care Everywhere.  I will check her fasting blood sugar and A1c  Orders:  •  Comprehensive metabolic panel; Future  •  Hemoglobin A1C; Future    COVID-19 virus infection               Disposition:     Discussed symptom directed medication options with patient. Discussed vitamin D, vitamin C, and/or zinc supplementation with patient.     Patient meets criteria for Paxlovid and they have been counseled appropriately regarding risks, benefits, side effects, and alternative treatment options. After discussion, patient agrees to treatment.    Possible side effects of Paxlovid?    Possible side effects of Paxlovid are:  - Liver Problems. Notify us right away if you start to experience loss of appetite, yellowing of your skin and the whites of eyes (jaundice), dark-colored urine, pale colored stools and itchy skin, stomach area (abdominal) pain.  - Resistance to HIV Medicines. If you have untreated HIV infection, Paxlovid  "may lead to some HIV medicines not working as well in the future.  - Other possible side effects include: altered sense of taste, diarrhea, high blood pressure, or muscle aches.         Recent Visits  No visits were found meeting these conditions.  Showing recent visits within past 7 days and meeting all other requirements  Today's Visits  Date Type Provider Dept   02/24/25 Telemedicine MD [facility]   Showing today's visits and meeting all other requirements  Future Appointments  No visits were found meeting these conditions.  Showing future appointments within next 150 days and meeting all other requirements    History of Present Illness         Subjective:   Ramona Flor is a 54 y.o. female who is concerned about COVID-19. Patient's symptoms include fever, chills, fatigue, malaise, nasal congestion, rhinorrhea, sore throat, anosmia, loss of taste, cough, myalgias and headache. Patient denies shortness of breath, chest tightness, abdominal pain, nausea, vomiting and diarrhea.     - Date of symptom onset: 2/21/2025      COVID-19 vaccination status: Partially vaccinated      No results found for: \"SARSCOV2\", \"[id number]\", \"SARSCORONAVI\", \"CORONAVIRUSR\", \"SARSCOVAG\", \"SARSCOVAGH\"    Review of Systems   Constitutional:  Positive for chills, fatigue and fever.   HENT:  Positive for congestion, rhinorrhea and sore throat.    Respiratory:  Positive for cough. Negative for chest tightness and shortness of breath.    Gastrointestinal:  Negative for abdominal pain, diarrhea, nausea and vomiting.   Musculoskeletal:  Positive for myalgias.   Neurological:  Positive for headaches.     Objective   Ht 4' 11.75\" (1.518 m)   Wt 74.8 kg (165 lb)   BMI 32.49 kg/m²     Physical Exam  Constitutional:       General: She is not in acute distress.     Appearance: She is not toxic-appearing.   HENT:      Head: Normocephalic.   Pulmonary:      Effort: Pulmonary effort is normal. No respiratory " distress.   Neurological:      Mental Status: She is alert and oriented to person, place, and time.      Cranial Nerves: No cranial nerve deficit.      Motor: No weakness.   Psychiatric:         Mood and Affect: Mood normal.         Administrative Statements   Encounter provider Dillan Espino MD    The Patient is located at Home and in the following state in which I hold an active license [place].    The patient was identified by name and date of birth. Ramona Flor was informed that this is a telemedicine visit and that the visit is being conducted through the Epic Embedded platform. She agrees to proceed..  My office door was closed. No one else was in the room.  She acknowledged consent and understanding of privacy and security of the video platform. The patient has agreed to participate and understands they can discontinue the visit at any time.    I have spent a total time of 20 minutes in caring for this patient on the day of the visit/encounter including Diagnostic results, Risks and benefits of tx options, Instructions for management, Documenting in the medical record, Reviewing/placing orders in the medical record (including tests, medications, and/or procedures), and Obtaining or reviewing history  .

## 2025-02-24 NOTE — ASSESSMENT & PLAN NOTE
Has hyperlipidemia.  She requested rechecking her lipid profile  Orders:  •  Comprehensive metabolic panel; Future  •  Lipid panel; Future

## 2025-02-24 NOTE — ASSESSMENT & PLAN NOTE
She impaired fasting glucose last August on review of labs in Care Everywhere.  I will check her fasting blood sugar and A1c  Orders:  •  Comprehensive metabolic panel; Future  •  Hemoglobin A1C; Future

## 2025-03-03 ENCOUNTER — HOSPITAL ENCOUNTER (OUTPATIENT)
Dept: SLEEP CENTER | Facility: CLINIC | Age: 55
Discharge: HOME | End: 2025-03-03
Payer: COMMERCIAL

## 2025-03-03 DIAGNOSIS — R29.818 SUSPECTED SLEEP APNEA: ICD-10-CM

## 2025-03-03 PROBLEM — G47.33 OSA (OBSTRUCTIVE SLEEP APNEA): Status: ACTIVE | Noted: 2025-03-03

## 2025-03-03 PROCEDURE — 95810 POLYSOM 6/> YRS 4/> PARAM: CPT | Performed by: INTERNAL MEDICINE

## 2025-03-03 PROCEDURE — 95810 POLYSOM 6/> YRS 4/> PARAM: CPT

## 2025-03-03 NOTE — PROGRESS NOTES
Sleep Study Documentation    Pre-Sleep Study       Sleep testing procedure explained to patient:YES    Patient napped prior to study:NO    Caffeine:Nightshift worker after midnight.  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 Patient stated that she has been told that she snores.     Sleep Study: Diagnostic   Snore:None  Supplemental O2: no      Minimum SaO2 85  Baseline SaO2 94    EKG abnormalities: no     EEG abnormalities: no    Were abnormal behaviors in sleep observed:NO    Is Total Sleep Study Recording Time < 2 hours: N/A    Is Total Sleep Study Recording Time > 2 hours but study is incomplete: N/A    Is Total Sleep Study Recording Time 6 hours or more but sleep was not obtained: NO    Patient classification: employed       Post-Sleep Study    Medication used at bedtime or during sleep study:NO    Patient reports time it took to fall asleep:less than 20 minutes    Patient reports waking up during study:1 to 2 times.  Patient reports returning to sleep without difficulty.    Patient reports sleeping 2 to 4 hours without dreaming.    Does the Patient feel this is a typical night of sleep:typical    Patient rated sleepiness: Somewhat sleepy or tired    PAP treatment:no.

## 2025-03-04 DIAGNOSIS — E78.2 MIXED HYPERLIPIDEMIA: ICD-10-CM

## 2025-03-04 DIAGNOSIS — F32.A DEPRESSION, UNSPECIFIED DEPRESSION TYPE: Primary | ICD-10-CM

## 2025-03-04 DIAGNOSIS — E66.811 CLASS 1 OBESITY DUE TO EXCESS CALORIES WITH BODY MASS INDEX (BMI) OF 32.0 TO 32.9 IN ADULT, UNSPECIFIED WHETHER SERIOUS COMORBIDITY PRESENT: ICD-10-CM

## 2025-03-04 DIAGNOSIS — E66.09 CLASS 1 OBESITY DUE TO EXCESS CALORIES WITH BODY MASS INDEX (BMI) OF 32.0 TO 32.9 IN ADULT, UNSPECIFIED WHETHER SERIOUS COMORBIDITY PRESENT: ICD-10-CM

## 2025-03-04 RX ORDER — SEMAGLUTIDE 0.5 MG/.5ML
INJECTION, SOLUTION SUBCUTANEOUS
Qty: 2 ML | Refills: 0 | Status: SHIPPED | OUTPATIENT
Start: 2025-03-04

## 2025-03-04 NOTE — TELEPHONE ENCOUNTER
Reason for call:   [x] Refill   [] Prior Auth  [] Other:     Office:   [x] PCP/Provider -  Gilbert Vicente MD  [place]  [address]  [] Specialty/Provider -     Medication: Semaglutide-Weight Management (Wegovy)     Dose/Frequency: 0.5mg/0.5ml   Inject 0.5 mg under the skin weekly     Quantity: 2ml    Pharmacy: [id number]    Does the patient have enough for 3 days?   [] Yes   [x] No - Send as HP to POD

## 2025-03-04 NOTE — TELEPHONE ENCOUNTER
Patient called the RX Refill Line. Message is being forwarded to the office.     Patient is requesting Venlafaxine XR 150mg last ordered by historical Provider Araceli Simeon  Patient is requesting medication to be filled at your office. Patient states she is out of medication      Please contact patient at [phone]      Pharmacy [id number]

## 2025-03-05 RX ORDER — VENLAFAXINE HYDROCHLORIDE 150 MG/1
CAPSULE, EXTENDED RELEASE ORAL
Qty: 90 CAPSULE | Refills: 3 | Status: SHIPPED | OUTPATIENT
Start: 2025-03-05

## 2025-03-05 NOTE — TELEPHONE ENCOUNTER
Spoke to pt. She stated she takes it because she cries a lot and has depression.   She has been taking it for aprox 7 years.

## 2025-03-07 ENCOUNTER — TELEPHONE (OUTPATIENT)
Dept: SLEEP CENTER | Facility: CLINIC | Age: 55
End: 2025-03-07

## 2025-03-07 DIAGNOSIS — G47.33 OSA (OBSTRUCTIVE SLEEP APNEA): Primary | ICD-10-CM

## 2025-03-07 NOTE — TELEPHONE ENCOUNTER
Called and spoke to patient and told her about sleep study results  She is amenable for CPAP  Routing CPAP Rx and info to [facility]

## 2025-03-10 ENCOUNTER — TELEPHONE (OUTPATIENT)
Age: 55
End: 2025-03-10

## 2025-03-10 NOTE — TELEPHONE ENCOUNTER
CPAP Auto New DME ordered.   Notation added to recent OV Note    ----- Message from Eladio Lea MD sent at 3/7/2025  2:44 PM EST -----  Hi,    I called this pt and explained the sleep study results.  She wants to try CPAP  Please process my CPAP order and schedule this pt for follow up in 2-3 months for compliance data review.  Thank you.    Roc

## 2025-03-10 NOTE — TELEPHONE ENCOUNTER
Lvm that I would be cancelling her f/u appt on 4/14 in [place] because it is too soon.  She is on recall list for sophia for a appt for may/Alanna   her cpap was ordered on 3/10

## 2025-03-14 LAB

## 2025-03-16 DIAGNOSIS — G44.209 TENSION HEADACHE: ICD-10-CM

## 2025-03-17 RX ORDER — IBUPROFEN 400 MG/1
TABLET, FILM COATED ORAL
Qty: 90 TABLET | Refills: 0 | Status: SHIPPED | OUTPATIENT
Start: 2025-03-17

## 2025-03-18 ENCOUNTER — TELEPHONE (OUTPATIENT)
Dept: SLEEP CENTER | Facility: CLINIC | Age: 55
End: 2025-03-18

## 2025-03-18 DIAGNOSIS — G47.33 OSA (OBSTRUCTIVE SLEEP APNEA): Primary | ICD-10-CM

## 2025-03-18 NOTE — TELEPHONE ENCOUNTER
Pt was set up on 3/18/25 in the [facility] office with a resmed S11 auto cpap set at 5-15cm and gave mask airfit F40 SW. Can we please get a script for full face as she said she breaths through her mouth at night. Thank you.

## 2025-03-19 ENCOUNTER — TELEPHONE (OUTPATIENT)
Dept: SLEEP CENTER | Facility: CLINIC | Age: 55
End: 2025-03-19

## 2025-03-20 LAB

## 2025-03-21 LAB

## 2025-04-11 ENCOUNTER — TELEPHONE (OUTPATIENT)
Age: 55
End: 2025-04-11

## 2025-04-11 NOTE — TELEPHONE ENCOUNTER
Lvm for 31 to 90 day compliance for new cpap.  With [facility] or ap  pt lives in [place]  not sure what location is best.

## 2025-05-30 DIAGNOSIS — E78.2 MIXED HYPERLIPIDEMIA: ICD-10-CM

## 2025-05-30 DIAGNOSIS — E66.811 CLASS 1 OBESITY DUE TO EXCESS CALORIES WITH BODY MASS INDEX (BMI) OF 32.0 TO 32.9 IN ADULT, UNSPECIFIED WHETHER SERIOUS COMORBIDITY PRESENT: ICD-10-CM

## 2025-05-30 DIAGNOSIS — J06.9 UPPER RESPIRATORY TRACT INFECTION, UNSPECIFIED TYPE: ICD-10-CM

## 2025-05-30 DIAGNOSIS — E66.09 CLASS 1 OBESITY DUE TO EXCESS CALORIES WITH BODY MASS INDEX (BMI) OF 32.0 TO 32.9 IN ADULT, UNSPECIFIED WHETHER SERIOUS COMORBIDITY PRESENT: ICD-10-CM

## 2025-06-02 RX ORDER — SEMAGLUTIDE 0.5 MG/.5ML
INJECTION, SOLUTION SUBCUTANEOUS
Qty: 2 ML | Refills: 5 | Status: SHIPPED | OUTPATIENT
Start: 2025-06-02